# Patient Record
Sex: MALE | Race: WHITE | Employment: UNEMPLOYED | URBAN - METROPOLITAN AREA
[De-identification: names, ages, dates, MRNs, and addresses within clinical notes are randomized per-mention and may not be internally consistent; named-entity substitution may affect disease eponyms.]

---

## 2017-06-27 ENCOUNTER — HOSPITAL ENCOUNTER (OUTPATIENT)
Dept: SLEEP CENTER | Age: 22
Discharge: HOME | End: 2017-06-27
Payer: COMMERCIAL

## 2017-06-27 VITALS — HEIGHT: 68 IN | HEART RATE: 92 BPM | BODY MASS INDEX: 30.31 KG/M2 | RESPIRATION RATE: 24 BRPM | WEIGHT: 200 LBS

## 2017-06-27 PROCEDURE — 95810 POLYSOM 6/> YRS 4/> PARAM: CPT

## 2017-06-27 ASSESSMENT — SLEEP AND FATIGUE QUESTIONNAIRES
HOW LIKELY ARE YOU TO NOD OFF OR FALL ASLEEP WHILE SITTING QUIETLY AFTER LUNCH WITHOUT ALCOHOL: 0
HOW LIKELY ARE YOU TO NOD OFF OR FALL ASLEEP WHILE SITTING AND READING: 1
ESS TOTAL SCORE: 6
HOW LIKELY ARE YOU TO NOD OFF OR FALL ASLEEP IN A CAR, WHILE STOPPED FOR A FEW MINUTES IN TRAFFIC: 0
HOW LIKELY ARE YOU TO NOD OFF OR FALL ASLEEP WHILE SITTING AND TALKING TO SOMEONE: 0
HOW LIKELY ARE YOU TO NOD OFF OR FALL ASLEEP WHILE SITTING INACTIVE IN A PUBLIC PLACE: 1
HOW LIKELY ARE YOU TO NOD OFF OR FALL ASLEEP WHILE WATCHING TV: 2
NECK CIRCUMFERENCE (INCHES): 37
HOW LIKELY ARE YOU TO NOD OFF OR FALL ASLEEP WHILE LYING DOWN TO REST IN THE AFTERNOON WHEN CIRCUMSTANCES PERMIT: 1
HOW LIKELY ARE YOU TO NOD OFF OR FALL ASLEEP WHEN YOU ARE A PASSENGER IN A CAR FOR AN HOUR WITHOUT A BREAK: 1

## 2021-04-30 ENCOUNTER — HOSPITAL ENCOUNTER (EMERGENCY)
Age: 26
Discharge: HOME | End: 2021-04-30
Attending: EMERGENCY MEDICINE
Payer: MEDICARE

## 2021-04-30 VITALS
TEMPERATURE: 98.4 F | HEART RATE: 87 BPM | WEIGHT: 217.4 LBS | SYSTOLIC BLOOD PRESSURE: 147 MMHG | OXYGEN SATURATION: 99 % | DIASTOLIC BLOOD PRESSURE: 85 MMHG | RESPIRATION RATE: 14 BRPM | BODY MASS INDEX: 33.06 KG/M2

## 2021-04-30 DIAGNOSIS — R68.84 JAW PAIN: Primary | ICD-10-CM

## 2021-04-30 PROCEDURE — 6360000002 HC RX W HCPCS: Performed by: EMERGENCY MEDICINE

## 2021-04-30 PROCEDURE — 99283 EMERGENCY DEPT VISIT LOW MDM: CPT

## 2021-04-30 RX ORDER — DEXAMETHASONE SODIUM PHOSPHATE 10 MG/ML
10 INJECTION, SOLUTION INTRAMUSCULAR; INTRAVENOUS ONCE
Status: COMPLETED | OUTPATIENT
Start: 2021-04-30 | End: 2021-04-30

## 2021-04-30 RX ORDER — ALBUTEROL SULFATE 90 UG/1
2 AEROSOL, METERED RESPIRATORY (INHALATION) EVERY 6 HOURS PRN
COMMUNITY

## 2021-04-30 RX ORDER — FLUTICASONE PROPIONATE 50 MCG
SPRAY, SUSPENSION (ML) NASAL
COMMUNITY
Start: 2021-03-31

## 2021-04-30 RX ORDER — LORATADINE 10 MG/1
TABLET ORAL
COMMUNITY
Start: 2021-03-31

## 2021-04-30 RX ADMIN — DEXAMETHASONE SODIUM PHOSPHATE 10 MG: 10 INJECTION, SOLUTION INTRAMUSCULAR; INTRAVENOUS at 12:52

## 2021-04-30 NOTE — ED PROVIDER NOTES
EMERGENCY DEPARTMENT ENCOUNTER    Pt Name: Donalda Mohs  MRN: [id number]  [place] 1995  Date of evaluation: 4/30/21  CHIEF COMPLAINT       Chief Complaint   Patient presents with    Jaw Pain     HISTORY OF PRESENT ILLNESS   This is a 68-year-old male that presents with complaints of pain and discomfort in the right temporomandibular joint. The patient states that he was yawning yesterday, he felt a pop and had pain and discomfort. Patient states that his pain has been mild since then. He feels like his teeth are lining up appropriately, he has pain only when he opens his mouth. REVIEW OF SYSTEMS     Review of Systems   HENT:        Jaw pain   All other systems reviewed and are negative. PASTMEDICAL HISTORY     Past Medical History:   Diagnosis Date    Seasonal allergies      Past Problem List  There is no problem list on file for this patient. SURGICAL HISTORY       Past Surgical History:   Procedure Laterality Date    CHOLECYSTECTOMY      TYMPANOSTOMY TUBE PLACEMENT      WISDOM TOOTH EXTRACTION       CURRENT MEDICATIONS       Discharge Medication List as of 4/30/2021  1:09 PM      CONTINUE these medications which have NOT CHANGED    Details   loratadine (CLARITIN) 10 MG tablet Historical Med      fluticasone (FLONASE) 50 MCG/ACT nasal spray Historical Med      montelukast (SINGULAIR) 10 MG tablet Take 10 mg by mouth nightly      albuterol sulfate HFA (VENTOLIN HFA) 108 (90 Base) MCG/ACT inhaler Inhale 2 puffs into the lungs every 6 hours as needed for [address]     has No Known Allergies. FAMILY HISTORY     has no family status information on file.       SOCIAL HISTORY       Social History     Tobacco Use    Smoking status: Never Smoker    Smokeless tobacco: Never Used   Substance Use Topics    Alcohol use: No    Drug use: No     PHYSICAL EXAM     INITIAL VITALS: BP (!) 147/85   Pulse 87   Temp 98.4 °F (36.9 °C)   Resp 14   Wt 217 lb 6.4 oz (98.6 kg)   SpO2 99%   BMI 33.06 kg/m²    Physical Exam  Constitutional:       Appearance: Normal appearance. HENT:      Head: Normocephalic and atraumatic. Jaw: Trismus and pain on movement present. No tenderness, swelling or malocclusion. Eyes:      Extraocular Movements: Extraocular movements intact. Pupils: Pupils are equal, round, and reactive to light. Cardiovascular:      Rate and Rhythm: Normal rate and regular rhythm. Pulmonary:      Effort: Pulmonary effort is normal.      Breath sounds: Normal breath sounds. Abdominal:      General: Abdomen is flat. Palpations: Abdomen is soft. Tenderness: There is no abdominal tenderness. Neurological:      Mental Status: He is alert. MEDICAL DECISION MAKIN-year-old male presents with complaints of pain and discomfort in the right jaw, no evidence of subluxation, the patient has full range of motion and movement, minimal pain. Plan is single dose steroids, discharged with outpatient follow-up, over-the-counter pain control and reevaluation. CRITICAL CARE:       PROCEDURES:    Procedures    DIAGNOSTIC RESULTS   EKG:All EKG's are interpreted by the Emergency Department Physician who either signs or Co-signs this chart in the absence of a cardiologist.        RADIOLOGY:All plain film, CT, MRI, and formal ultrasound images (except ED bedside ultrasound) are read by the radiologist, see reports below, unless otherwisenoted in MDM or here. No orders to display     LABS: All lab results were reviewed by myself, and all abnormals are listed below.   Labs Reviewed - No data to display    EMERGENCY DEPARTMENTCOURSE:         Vitals:    Vitals:    21 1123   BP: (!) 147/85   Pulse: 87   Resp: 14   Temp: 98.4 °F (36.9 °C)   SpO2: 99%   Weight: 217 lb 6.4 oz (98.6 kg)       The patient was given the following medications while in the emergency department:  Orders Placed This Encounter   Medications    dexamethasone (PF) (DECADRON) injection 10 mg     CONSULTS:  None    FINAL IMPRESSION      1.  Jaw pain          DISPOSITION/PLAN   DISPOSITION Decision To Discharge 04/30/2021 12:50:53 PM      PATIENT REFERRED TO:  Zamzam Jacobsen MD  [address] [place] [address],[address] [facility],[address]  [address] [facility] [address] [phone]    Schedule an appointment as soon as possible for a visit in 2 days      DISCHARGE MEDICATIONS:  Discharge Medication List as of 4/30/2021  1:09 PM        Jose Morgan MD  Attending Emergency Physician                   Jose Morgan MD  04/30/21 [id number]

## 2022-11-23 ENCOUNTER — OFFICE VISIT (OUTPATIENT)
Dept: DERMATOLOGY | Age: 27
End: 2022-11-23

## 2022-11-23 ENCOUNTER — HOSPITAL ENCOUNTER (OUTPATIENT)
Age: 27
Setting detail: SPECIMEN
Discharge: HOME | End: 2022-11-23

## 2022-11-23 VITALS
SYSTOLIC BLOOD PRESSURE: 140 MMHG | WEIGHT: 210.8 LBS | DIASTOLIC BLOOD PRESSURE: 92 MMHG | HEART RATE: 107 BPM | BODY MASS INDEX: 32.05 KG/M2 | OXYGEN SATURATION: 97 % | TEMPERATURE: 97.4 F

## 2022-11-23 DIAGNOSIS — L85.8 KERATOSIS PILARIS: ICD-10-CM

## 2022-11-23 DIAGNOSIS — L70.0 ACNE VULGARIS: Primary | ICD-10-CM

## 2022-11-23 DIAGNOSIS — D22.9 NEVUS SEBACEOUS: ICD-10-CM

## 2022-11-23 DIAGNOSIS — Z79.899 ON ISOTRETINOIN THERAPY: ICD-10-CM

## 2022-11-23 RX ORDER — IBUPROFEN 800 MG/1
TABLET ORAL
COMMUNITY
Start: 2022-05-25

## 2022-11-23 RX ORDER — BUPROPION HYDROCHLORIDE 150 MG/1
TABLET ORAL
COMMUNITY
Start: 2022-11-01

## 2022-11-23 RX ORDER — SULFAMETHOXAZOLE AND TRIMETHOPRIM 800; 160 MG/1; MG/1
TABLET ORAL
COMMUNITY
Start: 2022-08-25

## 2022-11-23 RX ORDER — CLINDAMYCIN PHOSPHATE 11.9 MG/ML
SOLUTION TOPICAL DAILY
COMMUNITY
Start: 2022-04-05

## 2022-11-23 RX ORDER — LIDOCAINE HYDROCHLORIDE 10 MG/ML
5 INJECTION, SOLUTION INFILTRATION; PERINEURAL ONCE
Status: SHIPPED | OUTPATIENT
Start: 2022-11-23

## 2022-11-23 RX ORDER — BENZONATATE 200 MG/1
CAPSULE ORAL
COMMUNITY
Start: 2022-09-08

## 2022-11-23 NOTE — PROGRESS NOTES
Dermatology Patient Note  [address]  [facility]  Dept: [phone]  Dept Fax: [phone]      VISITDATE: 11/23/2022   REFERRING PROVIDER: No ref. provider found      Anne Meza is a 32 y.o. male  who presents today in the office for:    Establish Care (Pt concerned with bumps on BL arms, BL torso) and Acne (Face, BL hips, BL thighs)      HISTORY OF PRESENT ILLNESS:  Pt has large tender cystic lesions on face, buttocks, chest, and back. He has failed Tx with minocycline, Bactrim, tretinoin, and topical clindamycin. Pt also notes lesion on right posterolateral scalp x many years. This lesion is irritated/tender with haircuts. MEDICAL PROBLEMS:  There are no problems to display for this patient.       CURRENT MEDICATIONS:   Current Outpatient Medications   Medication Sig Dispense Refill    benzoyl peroxide 5 % external liquid Apply topically daily      buPROPion (WELLBUTRIN XL) 150 MG extended release tablet       clindamycin (CLEOCIN T) 1 % external solution Apply topically daily      ibuprofen (ADVIL;MOTRIN) 800 MG tablet TAKE ONE TABLET BY MOUTH EVERY 8 HOURS AS NEEDED FOR PAIN      loratadine (CLARITIN) 10 MG tablet       fluticasone (FLONASE) 50 MCG/ACT nasal spray       albuterol sulfate HFA (PROVENTIL;VENTOLIN;PROAIR) 108 (90 Base) MCG/ACT inhaler Inhale 2 puffs into the lungs every 6 hours as needed for Wheezing      montelukast (SINGULAIR) 10 MG tablet Take 10 mg by mouth nightly      benzonatate (TESSALON) 200 MG capsule  (Patient not taking: Reported on 11/23/2022)      sulfamethoxazole-trimethoprim (BACTRIM DS;SEPTRA DS) 800-160 MG per tablet  (Patient not taking: Reported on 11/23/2022)       Current Facility-Administered Medications   Medication Dose Route Frequency Provider Last Rate Last Admin    lidocaine 1 % injection 5 mg  5 mg IntraDERmal Once Esequiel Sepulveda PA-C ALLERGIES:   Allergies   Allergen Reactions    Penicillins        SOCIAL HISTORY:  Social History     Tobacco Use    Smoking status: Never    Smokeless tobacco: Never   Substance Use Topics    Alcohol use: No       Pertinent ROS:  Review of Systems  Skin: Denies any new changing, growing or bleeding lesions or rashes except as described in the HPI   Constitutional: Denies fevers, chills, and malaise. PHYSICAL EXAM:   BP (!) 140/92   Pulse (!) 107   Temp 97.4 °F (36.3 °C) (Temporal)   Wt 210 lb 12.8 oz (95.6 kg)   SpO2 97%   BMI 32.05 kg/m²     The patient is generally well appearing, well nourished, alert and conversational. Affect is normal.    Cutaneous Exam:  Physical Exam  Waist-up + limited LEs: Head/face,neck, both arms, chest, back, abdomen, digits and/or nails, and legs visible with pants/shorts and shoes/socks on was examined. Facial covering was removed during examination. Diagnoses/exam findings/medical history pertinent to this visit are listed below:    Assessment:   Diagnosis Orders   1. Acne vulgaris        2. Nevus sebaceous  lidocaine 1 % injection 5 mg    Surgical Pathology    PA SHAV SKIN LES 11-20MM FACE,FACIAL      3. Keratosis pilaris        4. On isotretinoin therapy  Hepatic Function Panel    Lipid, Fasting           Plan:  1. Acne vulgaris  - chronic illness with progression and/or exacerbation   Isotretinoin start  - Ipledge program consent obtained. The patient signed and initialed all requisite fields as a testament to his understanding of the program requirements.   iPLEDGE program consent forms scanned into chart.  - Goal isotretinoin dosage: 81520 mg  (95 kg) * 150 mg/kg)  - Anticipated initial isotretinoin dose: 40 mg  - Patient counseled to not share the medication with anyone nor donate blood while on isotretinoin       - Check baseline AST, ALT, and Triglycerides, then after 1 month of therapy, then repeat only if increasing dose or if abnormal  - Side effects of isotretinoin discussed, including teratogenicity, dryness, nosebleeds, muscle aches and pains, headaches, pseudotumor cerebri (which may manifest as severe headaches and vision changes), liver dysfunction, hypertriglyceridemia (which could lead to acute pancreatitis), mood changes, depression, psychosis, and suicide. They understand that they cannot donate blood while on isotretinoin and not to share the drug with anyone. Patient is to avoid tetracyclines and vitamin A supplements while on isotretinoin. Patient will not have any cosmetic procedures while on isotretinoin due to increased risk of scarring. The patient should discontinue isotretinoin and report to the ER for vision change or headaches not relieved by OTC pain relievers. Pt is to inform all other medical providers that pt is on isotretinoin. Patient counseled to immediately notify provider of any concerning side effects       2. Nevus sebaceous  Shave Removal: The procedure and its risks were explained including but not limited to pain, bleeding, infection, permanent scar, permanent pigment alteration and need for an additional procedure. Consent to proceed with the procedure was obtained from the patient or the parent. After cleaning with alcohol the 13 mm lesion on the Left posterolateral scalp was anesthetized with 1% lidocaine with epinephrine and was removed with a dermablade. Hemostasis was achieved with aluminum chloride and Vaseline and a bandage were applied.   - lidocaine 1 % injection 5 mg  - Surgical Pathology; Future  - NC SHAV SKIN LES 11-20MM FACE,FACIAL    3. Keratosis pilaris  - reassurance and education     4. On isotretinoin therapy  - Hepatic Function Panel; Future  - Lipid, Fasting; Future      RTC 1M    Future Appointments   Date Time Provider Denis Grace   12/22/2022  1:00 PM Gibran Luna PA-C  [facility]         There are no Patient Instructions on file for this visit.       Electronically signed by Gibran Luna PA-C on 11/23/22 at 5:07 PM EST

## 2022-11-30 LAB — DERMATOLOGY PATHOLOGY REPORT: NORMAL

## 2022-12-08 ENCOUNTER — HOSPITAL ENCOUNTER (OUTPATIENT)
Age: 27
Discharge: HOME | End: 2022-12-08
Payer: MEDICARE

## 2022-12-08 DIAGNOSIS — Z79.899 ON ISOTRETINOIN THERAPY: ICD-10-CM

## 2022-12-08 LAB
ALBUMIN SERPL-MCNC: 4.3 G/DL (ref 3.5–5.2)
ALBUMIN/GLOBULIN RATIO: 1.5 (ref 1–2.5)
ALP BLD-CCNC: 88 U/L (ref 40–129)
ALT SERPL-CCNC: 38 U/L (ref 5–41)
AST SERPL-CCNC: 22 U/L
BILIRUB SERPL-MCNC: 0.4 MG/DL (ref 0.3–1.2)
BILIRUBIN DIRECT: 0.1 MG/DL
BILIRUBIN, INDIRECT: 0.3 MG/DL (ref 0–1)
CHOLESTEROL, FASTING: 165 MG/DL
CHOLESTEROL/HDL RATIO: 4.6
HDLC SERPL-MCNC: 36 MG/DL
LDL CHOLESTEROL: 94 MG/DL (ref 0–130)
TOTAL PROTEIN: 7.1 G/DL (ref 6.4–8.3)
TRIGLYCERIDE, FASTING: 175 MG/DL

## 2022-12-08 PROCEDURE — 80061 LIPID PANEL: CPT

## 2022-12-08 PROCEDURE — 36415 COLL VENOUS BLD VENIPUNCTURE: CPT

## 2022-12-08 PROCEDURE — 80076 HEPATIC FUNCTION PANEL: CPT

## 2022-12-13 DIAGNOSIS — L70.0 ACNE VULGARIS: Primary | ICD-10-CM

## 2022-12-13 RX ORDER — ISOTRETINOIN 40 MG/1
40 CAPSULE ORAL DAILY
Qty: 30 CAPSULE | Refills: 0 | Status: SHIPPED | OUTPATIENT
Start: 2022-12-13 | End: 2023-01-12

## 2022-12-22 ENCOUNTER — TELEPHONE (OUTPATIENT)
Dept: DERMATOLOGY | Age: 27
End: 2022-12-22

## 2022-12-22 ENCOUNTER — OFFICE VISIT (OUTPATIENT)
Dept: DERMATOLOGY | Age: 27
End: 2022-12-22
Payer: MEDICARE

## 2022-12-22 VITALS
BODY MASS INDEX: 31.83 KG/M2 | HEIGHT: 68 IN | OXYGEN SATURATION: 97 % | DIASTOLIC BLOOD PRESSURE: 92 MMHG | SYSTOLIC BLOOD PRESSURE: 123 MMHG | WEIGHT: 210 LBS | HEART RATE: 106 BPM

## 2022-12-22 DIAGNOSIS — L70.0 ACNE VULGARIS: Primary | ICD-10-CM

## 2022-12-22 PROCEDURE — 1036F TOBACCO NON-USER: CPT | Performed by: PHYSICIAN ASSISTANT

## 2022-12-22 PROCEDURE — G8417 CALC BMI ABV UP PARAM F/U: HCPCS | Performed by: PHYSICIAN ASSISTANT

## 2022-12-22 PROCEDURE — G8484 FLU IMMUNIZE NO ADMIN: HCPCS | Performed by: PHYSICIAN ASSISTANT

## 2022-12-22 PROCEDURE — G8427 DOCREV CUR MEDS BY ELIG CLIN: HCPCS | Performed by: PHYSICIAN ASSISTANT

## 2022-12-22 PROCEDURE — 99214 OFFICE O/P EST MOD 30 MIN: CPT | Performed by: PHYSICIAN ASSISTANT

## 2022-12-22 NOTE — PROGRESS NOTES
Dermatology Patient Note  [address] [facility]  [address] [facility] [address] [address]  Dept: [phone]  Dept Fax: [phone]      VISITDATE: 12/22/2022   REFERRING PROVIDER: No ref. provider found      Kamala Argueta is a 32 y.o. male  who presents today in the office for:    Follow-up (Accutane follow up- states that he has only been on it for a week. States that he has had multiple nose bleeds since starting the medication, wanted to know if that was a possible side effect. )      HISTORY OF PRESENT ILLNESS:  Kath is on Isotretinoin. He was seen today for his monthly follow-up evaluation. Interim disease course: Acne is unchanged    Patient was queried about side effects from isotretinoin. He specifically denied mood changes, thoughts of wanting to hurt self or others, severe headaches or vision changes. He also denied any other side effects aside from dryness and epistaxis. MEDICAL PROBLEMS:  There are no problems to display for this patient.       CURRENT MEDICATIONS:   Current Outpatient Medications   Medication Sig Dispense Refill    ISOtretinoin (ACCUTANE) 40 MG chemo capsule Take 1 capsule by mouth daily 30 capsule 0    buPROPion (WELLBUTRIN XL) 150 MG extended release tablet       ibuprofen (ADVIL;MOTRIN) 800 MG tablet TAKE ONE TABLET BY MOUTH EVERY 8 HOURS AS NEEDED FOR PAIN      loratadine (CLARITIN) 10 MG tablet       fluticasone (FLONASE) 50 MCG/ACT nasal spray       albuterol sulfate HFA (PROVENTIL;VENTOLIN;PROAIR) 108 (90 Base) MCG/ACT inhaler Inhale 2 puffs into the lungs every 6 hours as needed for Wheezing      montelukast (SINGULAIR) 10 MG tablet Take 10 mg by mouth nightly      benzonatate (TESSALON) 200 MG capsule  (Patient not taking: Reported on 11/23/2022)      benzoyl peroxide 5 % external liquid Apply topically daily (Patient not taking: Reported on 12/22/2022)      clindamycin (CLEOCIN T) 1 % external solution Apply topically daily (Patient not taking: Reported on 12/22/2022)      sulfamethoxazole-trimethoprim (BACTRIM DS;SEPTRA DS) 800-160 MG per tablet  (Patient not taking: Reported on 11/23/2022)       Current Facility-Administered Medications   Medication Dose Route Frequency Provider Last Rate Last Admin    lidocaine 1 % injection 5 mg  5 mg IntraDERmal Once Debra Seed, PA-C           ALLERGIES:   Allergies   Allergen Reactions    Penicillins        SOCIAL HISTORY:  Social History     Tobacco Use    Smoking status: Never    Smokeless tobacco: Never   Substance Use Topics    Alcohol use: No       Pertinent ROS:  Review of Systems  Skin: Denies any new changing, growing or bleeding lesions or rashes except as described in the HPI   Constitutional: Denies fevers, chills, and malaise. PHYSICAL EXAM:   BP (!) 123/92   Pulse (!) 106   Ht 5' 8\" (1.727 m)   Wt 210 lb (95.3 kg)   SpO2 97%   BMI 31.93 kg/m²     The patient is generally well appearing, well nourished, alert and conversational. Affect is normal.    Cutaneous Exam:  Physical Exam  Face and neck only was examined. Facial covering was not removed during examination. Diagnoses/exam findings/medical history pertinent to this visit are listed below:    Assessment:   Diagnosis Orders   1. Acne vulgaris             Plan:  1.  Acne vulgaris  - chronic illness, responding to treatment but not yet at goal   Isotretinoin Continuation  - Isotretinoin start date: 14/14/2022  - Months of completed treatment: 8 days  - Current isotretinoin dose: 40 mg/day  - Anticipated isotretinoin dose for upcoming month: 80 mg/day  - Cumulative isotretinoin dose: 320 mg  - Goal isotretinoin dosage: 70375 mg  (95 kg 150 mg/kg)   - Patient counseled to immediately notify provider of any concerning side effects  - Patient counseled to not share the medication with anyone nor donate blood while on isotretinoin       - Check baseline AST, ALT, Triglycerides, then after 1 month of therapy, then repeat only if increasing dose or if abnormal       RTC 1M    Future Appointments   Date Time Provider Denis Lesly   1/24/2023  2:00 PM [facility]         There are no Patient Instructions on file for this visit.       Electronically signed by Nikko Rahman PA-C on 12/22/22 at 4:26 PM EST

## 2023-01-16 ENCOUNTER — TELEPHONE (OUTPATIENT)
Dept: DERMATOLOGY | Age: 28
End: 2023-01-16

## 2023-01-16 ENCOUNTER — HOSPITAL ENCOUNTER (OUTPATIENT)
Age: 28
Discharge: HOME | End: 2023-01-16
Payer: MEDICARE

## 2023-01-16 DIAGNOSIS — Z79.899 ON ISOTRETINOIN THERAPY: Primary | ICD-10-CM

## 2023-01-16 DIAGNOSIS — Z79.899 ON ISOTRETINOIN THERAPY: ICD-10-CM

## 2023-01-16 LAB
ALBUMIN SERPL-MCNC: 4.4 G/DL (ref 3.5–5.2)
ALBUMIN/GLOBULIN RATIO: 1.3 (ref 1–2.5)
ALP BLD-CCNC: 101 U/L (ref 40–129)
ALT SERPL-CCNC: 40 U/L (ref 5–41)
AST SERPL-CCNC: 26 U/L
BILIRUB SERPL-MCNC: 0.3 MG/DL (ref 0.3–1.2)
BILIRUBIN DIRECT: 0.1 MG/DL
BILIRUBIN, INDIRECT: 0.2 MG/DL (ref 0–1)
CHOLESTEROL, FASTING: 250 MG/DL
CHOLESTEROL/HDL RATIO: 6.8
HDLC SERPL-MCNC: 37 MG/DL
LDL CHOLESTEROL: 162 MG/DL (ref 0–130)
TOTAL PROTEIN: 7.9 G/DL (ref 6.4–8.3)
TRIGLYCERIDE, FASTING: 254 MG/DL

## 2023-01-16 PROCEDURE — 36415 COLL VENOUS BLD VENIPUNCTURE: CPT

## 2023-01-16 PROCEDURE — 80076 HEPATIC FUNCTION PANEL: CPT

## 2023-01-16 PROCEDURE — 80061 LIPID PANEL: CPT

## 2023-01-18 DIAGNOSIS — L70.0 ACNE VULGARIS: Primary | ICD-10-CM

## 2023-01-18 RX ORDER — ISOTRETINOIN 40 MG/1
40 CAPSULE ORAL 2 TIMES DAILY
Qty: 60 CAPSULE | Refills: 0 | Status: SHIPPED | OUTPATIENT
Start: 2023-01-18 | End: 2023-02-17

## 2023-01-24 ENCOUNTER — OFFICE VISIT (OUTPATIENT)
Dept: DERMATOLOGY | Age: 28
End: 2023-01-24
Payer: MEDICARE

## 2023-01-24 VITALS
DIASTOLIC BLOOD PRESSURE: 89 MMHG | BODY MASS INDEX: 33.18 KG/M2 | TEMPERATURE: 97.9 F | OXYGEN SATURATION: 100 % | WEIGHT: 218.2 LBS | SYSTOLIC BLOOD PRESSURE: 135 MMHG | HEART RATE: 99 BPM

## 2023-01-24 DIAGNOSIS — D23.9 INTRADERMAL NEVUS: ICD-10-CM

## 2023-01-24 DIAGNOSIS — Z79.899 ON ISOTRETINOIN THERAPY: Primary | ICD-10-CM

## 2023-01-24 DIAGNOSIS — L70.0 ACNE VULGARIS: ICD-10-CM

## 2023-01-24 PROCEDURE — 1036F TOBACCO NON-USER: CPT | Performed by: PHYSICIAN ASSISTANT

## 2023-01-24 PROCEDURE — 99214 OFFICE O/P EST MOD 30 MIN: CPT | Performed by: PHYSICIAN ASSISTANT

## 2023-01-24 PROCEDURE — G8427 DOCREV CUR MEDS BY ELIG CLIN: HCPCS | Performed by: PHYSICIAN ASSISTANT

## 2023-01-24 PROCEDURE — G8484 FLU IMMUNIZE NO ADMIN: HCPCS | Performed by: PHYSICIAN ASSISTANT

## 2023-01-24 PROCEDURE — G8417 CALC BMI ABV UP PARAM F/U: HCPCS | Performed by: PHYSICIAN ASSISTANT

## 2023-01-24 NOTE — PROGRESS NOTES
Dermatology Patient Note  [facility]  [address] [facility] [address] [address]  Dept: [phone]  Dept Fax: [phone]      VISITDATE: 1/24/2023   REFERRING PROVIDER: No ref. provider found      Marquita Salgado is a 32 y.o. male  who presents today in the office for:    Follow-up (Order new labs ) and Acne (Doing well m3 on accutane )      HISTORY OF PRESENT ILLNESS:  Kath is on Isotretinoin. He was seen today for his monthly follow-up evaluation. Interim disease course: Acne is slightly worse    Patient was queried about side effects from isotretinoin. He specifically denied mood changes, thoughts of wanting to hurt self or others, severe headaches or vision changes. He also denied any other side effects aside from dryness. MEDICAL PROBLEMS:  There are no problems to display for this patient.       CURRENT MEDICATIONS:   Current Outpatient Medications   Medication Sig Dispense Refill    ISOtretinoin (ACCUTANE) 40 MG chemo capsule Take 1 capsule by mouth 2 times daily 60 capsule 0    buPROPion (WELLBUTRIN XL) 150 MG extended release tablet       ibuprofen (ADVIL;MOTRIN) 800 MG tablet TAKE ONE TABLET BY MOUTH EVERY 8 HOURS AS NEEDED FOR PAIN      loratadine (CLARITIN) 10 MG tablet       fluticasone (FLONASE) 50 MCG/ACT nasal spray       albuterol sulfate HFA (PROVENTIL;VENTOLIN;PROAIR) 108 (90 Base) MCG/ACT inhaler Inhale 2 puffs into the lungs every 6 hours as needed for Wheezing      montelukast (SINGULAIR) 10 MG tablet Take 10 mg by mouth nightly      benzonatate (TESSALON) 200 MG capsule  (Patient not taking: Reported on 1/24/2023)      benzoyl peroxide 5 % external liquid Apply topically daily (Patient not taking: Reported on 1/24/2023)      clindamycin (CLEOCIN T) 1 % external solution Apply topically daily (Patient not taking: Reported on 1/24/2023)      sulfamethoxazole-trimethoprim (BACTRIM DS;SEPTRA DS) 800-160 MG per tablet  (Patient not taking: Reported on 1/24/2023)       Current Facility-Administered Medications   Medication Dose Route Frequency Provider Last Rate Last Admin    lidocaine 1 % injection 5 mg  5 mg IntraDERmal Once Candace Metzger PA-C           ALLERGIES:   Allergies   Allergen Reactions    Penicillins        SOCIAL HISTORY:  Social History     Tobacco Use    Smoking status: Never    Smokeless tobacco: Never   Substance Use Topics    Alcohol use: No       Pertinent ROS:  Review of Systems  Skin: Denies any new changing, growing or bleeding lesions or rashes except as described in the HPI   Constitutional: Denies fevers, chills, and malaise. PHYSICAL EXAM:   /89   Pulse 99   Temp 97.9 °F (36.6 °C) (Temporal)   Wt 218 lb 3.2 oz (99 kg)   SpO2 100%   BMI 33.18 kg/m²     The patient is generally well appearing, well nourished, alert and conversational. Affect is normal.    Cutaneous Exam:  Physical Exam  Sun-exposed skin: head/face, neck, both arms, digits and nails were examined. Facial covering was removed during examination. Diagnoses/exam findings/medical history pertinent to this visit are listed below:    Assessment:   Diagnosis Orders   1. On isotretinoin therapy  Lipid Panel    Hepatic Function Panel      2. Acne vulgaris        3. Intradermal nevus             Plan:  1. On isotretinoin therapy  - Will RF, pending labs    2.  Acne vulgaris  - chronic illness with progression and/or exacerbation   Isotretinoin Continuation  - Isotretinoin start date: 11/14/22  - Months of completed treatment: 2  - Current isotretinoin dose: 80 mg/day  - Anticipated isotretinoin dose for upcoming month: 80 mg/day  - Cumulative isotretinoin dose: 3600 mg  - Goal isotretinoin dosage: 36698 mg  (95 kg 150 mg/kg)   - Patient counseled to immediately notify provider of any concerning side effects  - Patient counseled to not share the medication with anyone nor donate blood while on isotretinoin - Check baseline AST, ALT, Triglycerides, then after 1 month of therapy, then repeat only if increasing dose or if abnormal   - Lipid Panel; Standing  - Hepatic Function Panel; Standing    3. Intradermal nevus  - reassurance and education       RTC 1M    Future Appointments   Date Time Provider Denis Grace   2/20/2023  2:00 PM Consuelo Manning PA-C  derm [facility]         Patient Instructions   -eat peanutbutter or peanutbutter product in the morning with meds    Patient Education Regarding Isotretinion    Isotretinoin is a good medication for many people struggling with severe acne. Most people tolerate it well. However, isotretinoin is a medication that does have some potentially serious side effects. The most serious side effect occurs when someone who is pregnant takes isotretinoin; therefore, someone who is pregnant must never be exposed to isotretinoin. Taking isotretinoin while pregnant has a high chance of causing severe birth defects or deformities in the baby. Because of this serious effect, a national program called I-pledge was developed to closely monitor the use of Isotretinoin. Everyone started on isotretinoin, male or female, must be enrolled in the I pledge program which pledges to prevent pregnancy in those taking isotretinoin. I-pledge Counseling Reviewed: If you are a female and become pregnant on isotretinoin, there is a high likelihood that the baby will have serious birth defects. Therefore, in order to be started on isotretinoin, females must be on 2 forms of birth control. The types of birth control acceptable for isotretinoin use will be discussed with you by your physician. It is important that you remain on the 2 forms of birth control the entire time that you are on isotretinoin and for one month after stopping isotretinoin.  If you have sexual intercourse without using the 2 forms of birth control or if there is any chance that you could be pregnant, it is important that you immediately stop your isotretinoin and notify your physician. You must also have a pregnancy test monthly. While on isotretinoin, you must never share your medication with anyone else. You must also never donate your blood while on isotretinoin and for one month after. Prior to filling your prescription each month, you will need to take an online test to verify your knowledge regarding the dangers of becoming pregnant while on isotretinoin. If you are a male, you should use condoms if you are sexually active to prevent pregnancy in your partner while on isotretinoin. A small amount of the isotretinoin drug is present in the semen of men who take it, and it is important not to expose females to this isotretinoin during sexual intercourse. You must never share your medication with anyone else. You must also never donate your blood while on isotretinoin and for one month after. Other potential side effects:  Common side effects that may occur during the course of treatment include severely chapped lips, nose bleeds, eye dryness, dry skin, hair thinning, joint aches, and muscle aches. While patients are on isotretinoin, their skin may heal poorly or more slowly. Patients are also very sensitive to the sun and at risk of having severe sunburns while taking isotretinoin. Regular use of a lip balm, Vaseline, or Aquaphor to the lips is important to prevent excess chapping. Vaseline can be put in the nostrils at night to prevent nose bleeds. Facial moisturizers that are noncomedogenic (wont clog pores) can be used on the face and regular moisturizers can be used on the body. Patients can sometimes not use their contact lenses while on isotretinoin and may need to use eye lubricants or artificial tears. Patients can sometimes experience nearsightedness when driving at night. Over the counter ibuprofen is fine to take for muscle and joint pain. Avoid Tylenol or acetaminophen.  Please notify your physician if muscle or joint pain is not controlled with over the counter ibuprofen. Avoid body piercing, and elective procedures that involve cutting or lasering the skin while on isotretinoin. Avoid prolonged sun exposure and wear sunscreen and sun protective clothing to prevent sunburns especially during spring and summer months. Other rare but more serious side effects may occur on isotretinoin. These include depression or other mood disorders, increased production of spinal fluid, inflammation of the liver, inflammation of the pancreas, elevated cholesterol or triglyceride levels, and blood cell abnormalities. Although these side effects are rare and most patients do not develop them, patients on isotretinoin need to be monitored closely with monthly labs and monthly visits with your doctor. It is important to never drink alcohol while on isotretinoin as this may increase the likelihood of inflammation of the liver. It is important to be honest with us about any changes in your mood, depression, or suicidal thoughts while on isotretinion. We also need to be made aware of recurrent or severe headaches that do not respond to over the counter medications or are associated with blurry vision. The labs must be obtained after fasting, meaning no food or drink other than water for 12 hours before the test. We cannot refill your isotretinoin unless you return for your monthly appointments and have your monthly labs performed. If you have inflammatory bowel disease, taking isotretinoin can worsen your symptoms. Some people have developed inflammatory bowel disease while on isotretinoin or after stopping it. Studies have not concluded that there is a direct causative relationship between isotretinoin and inflammatory bowel disease. Other medications:   Patients should stop all other acne medications while on isotretinoin including both oral and topical medications.  Your dermatologist will review your other medications to make sure these are safe to continue while on isotretinoin. Please notify all physicians that treat you that you are on isotretinoin so that they are aware of this when prescribing new medications. Do not take a multivitamin or vitamin A supplement while on isotretinoin. TIMELINE FOR ISOTRETINOIN TESTING    Day 0:  Male = Blood draw, iPledge Registration (Start medication)*  Female = Urine, Blood Draw, iPledge Registration    Day 31: Male = Blood draw (Increase dosage)*     Female = Urine (Start medication)*    2 Months:  Male = Blood draw (Refill)*      Female = Urine and Blood Draw (Increase dosage)*    3 Months:  Male = Refill at visit*  **        Female = Urine and Blood Draw (Refill)*    4 Months:  Male = Refill at visit*  **        Female = Urine (Refill)*  **    5 Months:  Male = Refill at visit*  **        Female = Urine (Refill)*  **    6 Months:  Male = Refill at visit*  **        Female = Urine (Refill)*  **    1 Month after discontinuation of medication:     Female = Urine (Close out iPledge)    (*) = Medication must be picked up within 7 days of sending script  (**) = Labs are required only if there are abnormalities that we are tracking  Sun Protection     There are two types of sun rays that are harmful to the skin. UVA rays cause skin aging and skin cancer, such as melanoma. UVB rays cause sunburns, cataracts, and also contribute to skin cancer. The American-Academy of Dermatology recommends that children and adults wear a broad spectrum, waterproof sunscreen with a Sun Protection Factor (SPF) of 30 or higher. It is important to check the ingredient label to be sure the sunscreen will protect the skin from both UVA and UVB sunrays. Your sunscreen should contain at least one of the following ingredients: titanium dioxide, zinc oxide, or avobenzone. Sunscreen will not be effective unless it is applied to all exposed skin. Sunscreens work best if they are applied 30 minutes before sun exposure. They should be reapplied every 2 hours and after any water exposure. Sunscreen is not perfect. It is important to use other methods to protect the skin from sun exposure also. Wear hats, sunglasses and other sun protective clothing when outdoors.   Stay in the shade during the peak hours of sun exposure between 10 AM and 4 PM.        Electronically signed by Candace Metzger PA-C on 1/24/23 at 5:36 PM EST

## 2023-01-24 NOTE — PATIENT INSTRUCTIONS
-eat peanutbutter or peanutbutter product in the morning with meds    Patient Education Regarding Isotretinion    Isotretinoin is a good medication for many people struggling with severe acne. Most people tolerate it well. However, isotretinoin is a medication that does have some potentially serious side effects. The most serious side effect occurs when someone who is pregnant takes isotretinoin; therefore, someone who is pregnant must never be exposed to isotretinoin. Taking isotretinoin while pregnant has a high chance of causing severe birth defects or deformities in the baby. Because of this serious effect, a national program called I-pledge was developed to closely monitor the use of Isotretinoin. Everyone started on isotretinoin, male or female, must be enrolled in the I pledge program which pledges to prevent pregnancy in those taking isotretinoin. I-pledge Counseling Reviewed: If you are a female and become pregnant on isotretinoin, there is a high likelihood that the baby will have serious birth defects. Therefore, in order to be started on isotretinoin, females must be on 2 forms of birth control. The types of birth control acceptable for isotretinoin use will be discussed with you by your physician. It is important that you remain on the 2 forms of birth control the entire time that you are on isotretinoin and for one month after stopping isotretinoin. If you have sexual intercourse without using the 2 forms of birth control or if there is any chance that you could be pregnant, it is important that you immediately stop your isotretinoin and notify your physician. You must also have a pregnancy test monthly. While on isotretinoin, you must never share your medication with anyone else. You must also never donate your blood while on isotretinoin and for one month after.  Prior to filling your prescription each month, you will need to take an online test to verify your knowledge regarding the dangers of becoming pregnant while on isotretinoin. If you are a male, you should use condoms if you are sexually active to prevent pregnancy in your partner while on isotretinoin. A small amount of the isotretinoin drug is present in the semen of men who take it, and it is important not to expose females to this isotretinoin during sexual intercourse. You must never share your medication with anyone else. You must also never donate your blood while on isotretinoin and for one month after. Other potential side effects:  Common side effects that may occur during the course of treatment include severely chapped lips, nose bleeds, eye dryness, dry skin, hair thinning, joint aches, and muscle aches. While patients are on isotretinoin, their skin may heal poorly or more slowly. Patients are also very sensitive to the sun and at risk of having severe sunburns while taking isotretinoin. Regular use of a lip balm, Vaseline, or Aquaphor to the lips is important to prevent excess chapping. Vaseline can be put in the nostrils at night to prevent nose bleeds. Facial moisturizers that are noncomedogenic (wont clog pores) can be used on the face and regular moisturizers can be used on the body. Patients can sometimes not use their contact lenses while on isotretinoin and may need to use eye lubricants or artificial tears. Patients can sometimes experience nearsightedness when driving at night. Over the counter ibuprofen is fine to take for muscle and joint pain. Avoid Tylenol or acetaminophen. Please notify your physician if muscle or joint pain is not controlled with over the counter ibuprofen. Avoid body piercing, and elective procedures that involve cutting or lasering the skin while on isotretinoin. Avoid prolonged sun exposure and wear sunscreen and sun protective clothing to prevent sunburns especially during spring and summer months. Other rare but more serious side effects may occur on isotretinoin.  These include depression or other mood disorders, increased production of spinal fluid, inflammation of the liver, inflammation of the pancreas, elevated cholesterol or triglyceride levels, and blood cell abnormalities. Although these side effects are rare and most patients do not develop them, patients on isotretinoin need to be monitored closely with monthly labs and monthly visits with your doctor. It is important to never drink alcohol while on isotretinoin as this may increase the likelihood of inflammation of the liver. It is important to be honest with us about any changes in your mood, depression, or suicidal thoughts while on isotretinion. We also need to be made aware of recurrent or severe headaches that do not respond to over the counter medications or are associated with blurry vision. The labs must be obtained after fasting, meaning no food or drink other than water for 12 hours before the test. We cannot refill your isotretinoin unless you return for your monthly appointments and have your monthly labs performed. If you have inflammatory bowel disease, taking isotretinoin can worsen your symptoms. Some people have developed inflammatory bowel disease while on isotretinoin or after stopping it. Studies have not concluded that there is a direct causative relationship between isotretinoin and inflammatory bowel disease. Other medications:   Patients should stop all other acne medications while on isotretinoin including both oral and topical medications. Your dermatologist will review your other medications to make sure these are safe to continue while on isotretinoin. Please notify all physicians that treat you that you are on isotretinoin so that they are aware of this when prescribing new medications. Do not take a multivitamin or vitamin A supplement while on isotretinoin.    TIMELINE FOR ISOTRETINOIN TESTING    Day 0:  Male = Blood draw, iPledge Registration (Start medication)*  Female = Urine, Blood Draw, iPledge Registration    Day 31: Male = Blood draw (Increase dosage)*     Female = Urine (Start medication)*    2 Months:  Male = Blood draw (Refill)*      Female = Urine and Blood Draw (Increase dosage)*    3 Months:  Male = Refill at visit*  **        Female = Urine and Blood Draw (Refill)*    4 Months:  Male = Refill at visit*  **        Female = Urine (Refill)*  **    5 Months:  Male = Refill at visit*  **        Female = Urine (Refill)*  **    6 Months:  Male = Refill at visit*  **        Female = Urine (Refill)*  **    1 Month after discontinuation of medication:     Female = Urine (Close out iPledge)    (*) = Medication must be picked up within 7 days of sending script  (**) = Labs are required only if there are abnormalities that we are tracking  Sun Protection     There are two types of sun rays that are harmful to the skin. UVA rays cause skin aging and skin cancer, such as melanoma. UVB rays cause sunburns, cataracts, and also contribute to skin cancer. The American-Academy of Dermatology recommends that children and adults wear a broad spectrum, waterproof sunscreen with a Sun Protection Factor (SPF) of 30 or higher. It is important to check the ingredient label to be sure the sunscreen will protect the skin from both UVA and UVB sunrays. Your sunscreen should contain at least one of the following ingredients: titanium dioxide, zinc oxide, or avobenzone. Sunscreen will not be effective unless it is applied to all exposed skin. Sunscreens work best if they are applied 30 minutes before sun exposure. They should be reapplied every 2 hours and after any water exposure. Sunscreen is not perfect. It is important to use other methods to protect the skin from sun exposure also. Wear hats, sunglasses and other sun protective clothing when outdoors.   Stay in the shade during the peak hours of sun exposure between 10 AM and 4 PM.

## 2023-02-20 ENCOUNTER — OFFICE VISIT (OUTPATIENT)
Dept: DERMATOLOGY | Age: 28
End: 2023-02-20
Payer: MEDICAID

## 2023-02-20 VITALS
WEIGHT: 214 LBS | BODY MASS INDEX: 32.43 KG/M2 | OXYGEN SATURATION: 98 % | SYSTOLIC BLOOD PRESSURE: 144 MMHG | TEMPERATURE: 97.8 F | HEART RATE: 105 BPM | HEIGHT: 68 IN | DIASTOLIC BLOOD PRESSURE: 93 MMHG

## 2023-02-20 DIAGNOSIS — L70.0 ACNE VULGARIS: Primary | ICD-10-CM

## 2023-02-20 DIAGNOSIS — Z79.899 ON ISOTRETINOIN THERAPY: ICD-10-CM

## 2023-02-20 PROCEDURE — 1036F TOBACCO NON-USER: CPT | Performed by: PHYSICIAN ASSISTANT

## 2023-02-20 PROCEDURE — G8427 DOCREV CUR MEDS BY ELIG CLIN: HCPCS | Performed by: PHYSICIAN ASSISTANT

## 2023-02-20 PROCEDURE — G8484 FLU IMMUNIZE NO ADMIN: HCPCS | Performed by: PHYSICIAN ASSISTANT

## 2023-02-20 PROCEDURE — 99214 OFFICE O/P EST MOD 30 MIN: CPT | Performed by: PHYSICIAN ASSISTANT

## 2023-02-20 PROCEDURE — G8417 CALC BMI ABV UP PARAM F/U: HCPCS | Performed by: PHYSICIAN ASSISTANT

## 2023-02-20 NOTE — PROGRESS NOTES
Dermatology Patient Note  [facility]  [address] [facility] [address] [address]  Dept: [phone]  Dept Fax: [phone]      VISITDATE: 2/20/2023   REFERRING PROVIDER: No ref. provider found      Manav Jacobo is a 32 y.o. male  who presents today in the office for:    Acne (Accutane follow up- states acne is getting better. States no new breakouts since last appt on 01/24/2022)      HISTORY OF PRESENT ILLNESS:  Kath is on Isotretinoin. He was seen today for his monthly follow-up evaluation. Interim disease course: Acne is improving    Patient was queried about side effects from isotretinoin. He specifically denied mood changes, thoughts of wanting to hurt self or others, severe headaches or vision changes. He also denied any other side effects aside from dryness. MEDICAL PROBLEMS:  There are no problems to display for this patient.       CURRENT MEDICATIONS:   Current Outpatient Medications   Medication Sig Dispense Refill    buPROPion (WELLBUTRIN XL) 150 MG extended release tablet       ibuprofen (ADVIL;MOTRIN) 800 MG tablet TAKE ONE TABLET BY MOUTH EVERY 8 HOURS AS NEEDED FOR PAIN      loratadine (CLARITIN) 10 MG tablet       fluticasone (FLONASE) 50 MCG/ACT nasal spray       albuterol sulfate HFA (PROVENTIL;VENTOLIN;PROAIR) 108 (90 Base) MCG/ACT inhaler Inhale 2 puffs into the lungs every 6 hours as needed for Wheezing      montelukast (SINGULAIR) 10 MG tablet Take 10 mg by mouth nightly      benzonatate (TESSALON) 200 MG capsule  (Patient not taking: No sig reported)       Current Facility-Administered Medications   Medication Dose Route Frequency Provider Last Rate Last Admin    lidocaine 1 % injection 5 mg  5 mg IntraDERmal Once Susie PostDAISHA           ALLERGIES:   Allergies   Allergen Reactions    Penicillins        SOCIAL HISTORY:  Social History     Tobacco Use    Smoking status: Never    Smokeless tobacco: Never   Substance Use Topics    Alcohol use: No       Pertinent ROS:  Review of Systems  Skin: Denies any new changing, growing or bleeding lesions or rashes except as described in the HPI   Constitutional: Denies fevers, chills, and malaise. PHYSICAL EXAM:   BP (!) 144/93   Pulse (!) 105   Temp 97.8 °F (36.6 °C)   Ht 5' 8\" (1.727 m)   Wt 214 lb (97.1 kg)   SpO2 98%   BMI 32.54 kg/m²     The patient is generally well appearing, well nourished, alert and conversational. Affect is normal.    Cutaneous Exam:  Physical Exam  Acne exam: exam of face, neck, chest, and back was performed. PIH and scarring present    Facial covering was removed during examination. Diagnoses/exam findings/medical history pertinent to this visit are listed below:    Assessment:   Diagnosis Orders   1. Acne vulgaris        2. On isotretinoin therapy             Plan:  1. Acne vulgaris  - chronic illness, responding to treatment but not yet at goal   Isotretinoin Continuation  - Isotretinoin start date: 11/14/22  - Months of completed treatment: 3  - Current isotretinoin dose: 80 mg/day  - Anticipated isotretinoin dose for upcoming month: 80 mg/day  - Cumulative isotretinoin dose: 6000 mg  - Goal isotretinoin dosage: 41134 mg  (95 kg 150 mg/kg)   - Patient counseled to immediately notify provider of any concerning side effects  - Patient counseled to not share the medication with anyone nor donate blood while on isotretinoin       - Check baseline AST, ALT, Triglycerides, then after 1 month of therapy, then repeat only if increasing dose or if abnormal     2. On isotretinoin therapy  - Continue monthly labs      RTC 1M    Future Appointments   Date Time Provider Denis Grace   3/20/2023  2:00 PM Daniel Gosselin, PA-C  derm [facility]         There are no Patient Instructions on file for this visit.       Electronically signed by Daniel Gosselin, PA-C on 2/20/23 at 5:09 PM EST

## 2023-03-06 ENCOUNTER — HOSPITAL ENCOUNTER (OUTPATIENT)
Age: 28
Discharge: HOME | End: 2023-03-06
Payer: MEDICAID

## 2023-03-06 DIAGNOSIS — Z79.899 ON ISOTRETINOIN THERAPY: ICD-10-CM

## 2023-03-06 DIAGNOSIS — L70.0 ACNE VULGARIS: Primary | ICD-10-CM

## 2023-03-06 LAB
ALBUMIN SERPL-MCNC: 4.4 G/DL (ref 3.5–5.2)
ALBUMIN/GLOBULIN RATIO: 1.2 (ref 1–2.5)
ALP SERPL-CCNC: 114 U/L (ref 40–129)
ALT SERPL-CCNC: 40 U/L (ref 5–41)
AST SERPL-CCNC: 25 U/L
BILIRUB DIRECT SERPL-MCNC: 0.1 MG/DL
BILIRUB INDIRECT SERPL-MCNC: 0.3 MG/DL (ref 0–1)
BILIRUB SERPL-MCNC: 0.4 MG/DL (ref 0.3–1.2)
CHOLEST SERPL-MCNC: 248 MG/DL
CHOLESTEROL/HDL RATIO: 7.5
HDLC SERPL-MCNC: 33 MG/DL
LDLC SERPL CALC-MCNC: 161 MG/DL (ref 0–130)
PROT SERPL-MCNC: 8 G/DL (ref 6.4–8.3)
TRIGL SERPL-MCNC: 270 MG/DL

## 2023-03-06 PROCEDURE — 36415 COLL VENOUS BLD VENIPUNCTURE: CPT

## 2023-03-06 PROCEDURE — 80076 HEPATIC FUNCTION PANEL: CPT

## 2023-03-06 PROCEDURE — 80061 LIPID PANEL: CPT

## 2023-03-06 RX ORDER — ISOTRETINOIN 40 MG/1
40 CAPSULE ORAL 2 TIMES DAILY
Qty: 60 CAPSULE | Refills: 0 | Status: SHIPPED | OUTPATIENT
Start: 2023-03-06 | End: 2023-04-05

## 2023-03-24 ENCOUNTER — OFFICE VISIT (OUTPATIENT)
Dept: DERMATOLOGY | Age: 28
End: 2023-03-24
Payer: MEDICAID

## 2023-03-24 VITALS
WEIGHT: 215 LBS | OXYGEN SATURATION: 96 % | SYSTOLIC BLOOD PRESSURE: 141 MMHG | HEART RATE: 118 BPM | BODY MASS INDEX: 32.69 KG/M2 | DIASTOLIC BLOOD PRESSURE: 86 MMHG | TEMPERATURE: 96.6 F

## 2023-03-24 DIAGNOSIS — L70.0 ACNE VULGARIS: Primary | ICD-10-CM

## 2023-03-24 DIAGNOSIS — Z79.899 ON ISOTRETINOIN THERAPY: ICD-10-CM

## 2023-03-24 PROCEDURE — 99214 OFFICE O/P EST MOD 30 MIN: CPT | Performed by: PHYSICIAN ASSISTANT

## 2023-03-24 NOTE — PROGRESS NOTES
Smoking status: Never    Smokeless tobacco: Never   Substance Use Topics    Alcohol use: No       Pertinent ROS:  Review of Systems  Skin: Denies any new changing, growing or bleeding lesions or rashes except as described in the HPI   Constitutional: Denies fevers, chills, and malaise. PHYSICAL EXAM:   BP (!) 141/86   Pulse (!) 118   Temp (!) 96.6 °F (35.9 °C)   Wt 215 lb (97.5 kg)   SpO2 96%   BMI 32.69 kg/m²     The patient is generally well appearing, well nourished, alert and conversational. Affect is normal.    Cutaneous Exam:  Physical Exam  Acne exam: exam of face, neck, chest, and back was performed. Facial covering was removed during examination. Diagnoses/exam findings/medical history pertinent to this visit are listed below:    Assessment:   Diagnosis Orders   1. Acne vulgaris        2. On isotretinoin therapy             Plan:  1. Acne vulgaris  - chronic illness, responding to treatment but not yet at goal   Isotretinoin Continuation  - Isotretinoin start date: 11/14/22  - Months of completed treatment: 4  - Current isotretinoin dose: 80 mg/day  - Anticipated isotretinoin dose for upcoming month: 80 mg/day  - Cumulative isotretinoin dose: 8400 mg  - Goal isotretinoin dosage: 48166 mg  (95 kg 150 mg/kg)   - Patient counseled to immediately notify provider of any concerning side effects  - Patient counseled to not share the medication with anyone nor donate blood while on isotretinoin       - Check baseline AST, ALT, Triglycerides, then after 1 month of therapy, then repeat only if increasing dose or if abnormal     2.  On isotretinoin therapy  Lab Results   Component Value Date    CHOL 248 (H) 03/06/2023     Lab Results   Component Value Date    TRIG 270 (H) 03/06/2023     Lab Results   Component Value Date    HDL 33 (L) 03/06/2023    HDL 37 (L) 01/16/2023    HDL 36 (L) 12/08/2022     Lab Results   Component Value Date    LDLCHOLESTEROL 161 (H) 03/06/2023    LDLCHOLESTEROL 162 (H) 01/16/2023

## 2023-04-20 ENCOUNTER — HOSPITAL ENCOUNTER (OUTPATIENT)
Age: 28
Discharge: HOME | End: 2023-04-20
Payer: MEDICAID

## 2023-04-20 DIAGNOSIS — Z79.899 ON ISOTRETINOIN THERAPY: ICD-10-CM

## 2023-04-20 LAB
ALBUMIN SERPL-MCNC: 4.3 G/DL (ref 3.5–5.2)
ALBUMIN/GLOBULIN RATIO: 1.3 (ref 1–2.5)
ALP SERPL-CCNC: 109 U/L (ref 40–129)
ALT SERPL-CCNC: 76 U/L (ref 5–41)
AST SERPL-CCNC: 35 U/L
BILIRUB DIRECT SERPL-MCNC: 0.1 MG/DL
BILIRUB INDIRECT SERPL-MCNC: 0.3 MG/DL (ref 0–1)
BILIRUB SERPL-MCNC: 0.4 MG/DL (ref 0.3–1.2)
CHOLEST SERPL-MCNC: 214 MG/DL
CHOLESTEROL/HDL RATIO: 6.7
HDLC SERPL-MCNC: 32 MG/DL
LDLC SERPL CALC-MCNC: 148 MG/DL (ref 0–130)
PROT SERPL-MCNC: 7.5 G/DL (ref 6.4–8.3)
TRIGL SERPL-MCNC: 171 MG/DL

## 2023-04-20 PROCEDURE — 36415 COLL VENOUS BLD VENIPUNCTURE: CPT

## 2023-04-20 PROCEDURE — 80061 LIPID PANEL: CPT

## 2023-04-20 PROCEDURE — 80076 HEPATIC FUNCTION PANEL: CPT

## 2023-04-25 DIAGNOSIS — L70.0 ACNE VULGARIS: Primary | ICD-10-CM

## 2023-04-25 RX ORDER — ISOTRETINOIN 40 MG/1
40 CAPSULE ORAL 2 TIMES DAILY
Qty: 60 CAPSULE | Refills: 0 | Status: SHIPPED | OUTPATIENT
Start: 2023-04-25 | End: 2023-05-25

## 2023-04-28 ENCOUNTER — OFFICE VISIT (OUTPATIENT)
Dept: DERMATOLOGY | Age: 28
End: 2023-04-28
Payer: MEDICAID

## 2023-04-28 VITALS
SYSTOLIC BLOOD PRESSURE: 122 MMHG | DIASTOLIC BLOOD PRESSURE: 82 MMHG | BODY MASS INDEX: 32.89 KG/M2 | OXYGEN SATURATION: 98 % | HEART RATE: 98 BPM | HEIGHT: 68 IN | WEIGHT: 217 LBS | TEMPERATURE: 97.2 F

## 2023-04-28 DIAGNOSIS — Z79.899 ON ISOTRETINOIN THERAPY: ICD-10-CM

## 2023-04-28 DIAGNOSIS — L70.0 ACNE VULGARIS: Primary | ICD-10-CM

## 2023-04-28 PROCEDURE — 99214 OFFICE O/P EST MOD 30 MIN: CPT | Performed by: PHYSICIAN ASSISTANT

## 2023-04-28 NOTE — PROGRESS NOTES
Dermatology Patient Note  [facility]  [address] [address]  Dept: [phone]  Dept Fax: [phone]      VISITDATE: 4/28/2023   REFERRING PROVIDER: No ref. provider found      Du Donahue is a 32 y.o. male  who presents today in the office for:    1 Month Follow-Up and Acne      HISTORY OF PRESENT ILLNESS:  Kath is on Isotretinoin. She is seen today for her monthly follow-up evaluation. Interim disease course: Acne is unchanged    Patient was queried about side effects from isotretinoin. She specifically denied mood changes, thoughts of wanting to hurt self or others, severe headaches or vision changes. She also denied any other side effects aside from dryness. Lab Results   Component Value Date/Time    [facility] 109 04/20/2023 12:52 PM    ALT 76 04/20/2023 12:52 PM    AST 35 04/20/2023 12:52 PM    PROT 7.5 04/20/2023 12:52 PM    BILITOT 0.4 04/20/2023 12:52 PM    BILIDIR 0.1 04/20/2023 12:52 PM    LABALBU 4.3 04/20/2023 12:52 PM    Elevated ALT on last hepatic panel. Pt to repeat labs in 2 weeks. MEDICAL PROBLEMS:  There are no problems to display for this patient.       CURRENT MEDICATIONS:   Current Outpatient Medications   Medication Sig Dispense Refill    ISOtretinoin (ACCUTANE) 40 MG chemo capsule Take 1 capsule by mouth 2 times daily 60 capsule 0    buPROPion (WELLBUTRIN XL) 150 MG extended release tablet       ibuprofen (ADVIL;MOTRIN) 800 MG tablet TAKE ONE TABLET BY MOUTH EVERY 8 HOURS AS NEEDED FOR PAIN      loratadine (CLARITIN) 10 MG tablet       fluticasone (FLONASE) 50 MCG/ACT nasal spray       albuterol sulfate HFA (PROVENTIL;VENTOLIN;PROAIR) 108 (90 Base) MCG/ACT inhaler Inhale 2 puffs into the lungs every 6 hours as needed for Wheezing      montelukast (SINGULAIR) 10 MG tablet Take 1 tablet by mouth nightly      benzonatate (TESSALON) 200 MG capsule  (Patient not taking:

## 2023-06-30 ENCOUNTER — OFFICE VISIT (OUTPATIENT)
Dept: DERMATOLOGY | Age: 28
End: 2023-06-30
Payer: MEDICAID

## 2023-06-30 VITALS
OXYGEN SATURATION: 96 % | TEMPERATURE: 98.4 F | HEART RATE: 95 BPM | DIASTOLIC BLOOD PRESSURE: 84 MMHG | BODY MASS INDEX: 32.48 KG/M2 | WEIGHT: 213.6 LBS | SYSTOLIC BLOOD PRESSURE: 141 MMHG

## 2023-06-30 DIAGNOSIS — L70.0 ACNE VULGARIS: Primary | ICD-10-CM

## 2023-06-30 PROCEDURE — 99213 OFFICE O/P EST LOW 20 MIN: CPT | Performed by: PHYSICIAN ASSISTANT

## 2025-06-10 ENCOUNTER — OFFICE VISIT (OUTPATIENT)
Age: 30
End: 2025-06-10
Payer: MEDICAID

## 2025-06-10 VITALS
SYSTOLIC BLOOD PRESSURE: 137 MMHG | HEART RATE: 91 BPM | WEIGHT: 209 LBS | TEMPERATURE: 97.4 F | DIASTOLIC BLOOD PRESSURE: 99 MMHG | BODY MASS INDEX: 31.67 KG/M2 | HEIGHT: 68 IN | OXYGEN SATURATION: 98 %

## 2025-06-10 DIAGNOSIS — L30.9 DERMATITIS, UNSPECIFIED: Primary | ICD-10-CM

## 2025-06-10 PROCEDURE — 11104 PUNCH BX SKIN SINGLE LESION: CPT | Performed by: PHYSICIAN ASSISTANT

## 2025-06-10 RX ORDER — KETOCONAZOLE 20 MG/G
CREAM TOPICAL
Qty: 60 G | Refills: 1 | Status: SHIPPED | OUTPATIENT
Start: 2025-06-10

## 2025-06-10 RX ORDER — TRIAMCINOLONE ACETONIDE 55 UG/1
SPRAY, METERED NASAL
COMMUNITY
Start: 2025-05-28

## 2025-06-16 ENCOUNTER — LAB (OUTPATIENT)
Dept: LAB | Age: 30
End: 2025-06-16

## 2025-06-17 ENCOUNTER — CLINICAL SUPPORT (OUTPATIENT)
Age: 30
End: 2025-06-17

## 2025-06-17 DIAGNOSIS — Z48.02 ENCOUNTER FOR REMOVAL OF SUTURES: Primary | ICD-10-CM

## 2025-06-17 PROCEDURE — 99024 POSTOP FOLLOW-UP VISIT: CPT | Performed by: PHYSICIAN ASSISTANT

## 2025-06-17 NOTE — PROGRESS NOTES
Kath Keys presented for suture removal on the left tricep. The biopsy site was well healed. The suture was removed and a band-aid applied. The patient left in good condition.

## 2025-06-24 ENCOUNTER — RESULTS FOLLOW-UP (OUTPATIENT)
Age: 30
End: 2025-06-24

## 2025-06-24 DIAGNOSIS — L01.02 EOSINOPHILIC PUSTULAR FOLLICULITIS: Primary | ICD-10-CM

## 2025-06-24 DIAGNOSIS — D72.18 EOSINOPHILIC PUSTULAR FOLLICULITIS: Primary | ICD-10-CM

## 2025-06-24 RX ORDER — CLOBETASOL PROPIONATE 0.5 MG/G
CREAM TOPICAL
Qty: 60 G | Refills: 1 | Status: SHIPPED | OUTPATIENT
Start: 2025-06-24

## 2025-07-08 ENCOUNTER — HOSPITAL ENCOUNTER (OUTPATIENT)
Age: 30
Setting detail: SPECIMEN
Discharge: HOME | End: 2025-07-08
Payer: MEDICAID

## 2025-07-08 DIAGNOSIS — D72.18 EOSINOPHILIC PUSTULAR FOLLICULITIS: ICD-10-CM

## 2025-07-08 DIAGNOSIS — L01.02 EOSINOPHILIC PUSTULAR FOLLICULITIS: ICD-10-CM

## 2025-07-08 LAB — HIV 1+2 AB+HIV1 P24 AG SERPL QL IA: NONREACTIVE

## 2025-07-08 PROCEDURE — 36415 COLL VENOUS BLD VENIPUNCTURE: CPT

## 2025-07-08 PROCEDURE — 87389 HIV-1 AG W/HIV-1&-2 AB AG IA: CPT
